# Patient Record
Sex: FEMALE | Race: WHITE | Employment: FULL TIME | URBAN - METROPOLITAN AREA
[De-identification: names, ages, dates, MRNs, and addresses within clinical notes are randomized per-mention and may not be internally consistent; named-entity substitution may affect disease eponyms.]

---

## 2020-08-10 ENCOUNTER — OFFICE VISIT (OUTPATIENT)
Dept: OBGYN CLINIC | Age: 30
End: 2020-08-10
Payer: COMMERCIAL

## 2020-08-10 ENCOUNTER — HOSPITAL ENCOUNTER (OUTPATIENT)
Dept: LAB | Age: 30
Discharge: HOME | End: 2020-08-10

## 2020-08-10 VITALS
WEIGHT: 174 LBS | DIASTOLIC BLOOD PRESSURE: 93 MMHG | SYSTOLIC BLOOD PRESSURE: 140 MMHG | BODY MASS INDEX: 27.97 KG/M2 | HEIGHT: 66 IN

## 2020-08-10 DIAGNOSIS — N92.6 IRREGULAR MENSES: ICD-10-CM

## 2020-08-10 DIAGNOSIS — Z01.419 ROUTINE GYNECOLOGICAL EXAMINATION: Primary | ICD-10-CM

## 2020-08-10 LAB
ALBUMIN SERPL-MCNC: 3.8 G/DL (ref 3.5–5)
ALBUMIN/GLOB SERPL: 1 {RATIO} (ref 1.1–2.2)
ALP SERPL-CCNC: 70 U/L (ref 45–117)
ALT SERPL-CCNC: 18 U/L (ref 12–78)
ANION GAP SERPL CALC-SCNC: 8 MMOL/L (ref 5–15)
AST SERPL-CCNC: 10 U/L (ref 15–37)
BILIRUB SERPL-MCNC: 0.2 MG/DL (ref 0.2–1)
BUN SERPL-MCNC: 10 MG/DL (ref 6–20)
BUN/CREAT SERPL: 13 (ref 12–20)
CALCIUM SERPL-MCNC: 9.4 MG/DL (ref 8.5–10.1)
CHLORIDE SERPL-SCNC: 106 MMOL/L (ref 97–108)
CO2 SERPL-SCNC: 26 MMOL/L (ref 21–32)
CREAT SERPL-MCNC: 0.78 MG/DL (ref 0.55–1.02)
GLOBULIN SER CALC-MCNC: 3.9 G/DL (ref 2–4)
GLUCOSE SERPL-MCNC: 82 MG/DL (ref 65–100)
POTASSIUM SERPL-SCNC: 4.5 MMOL/L (ref 3.5–5.1)
PROT SERPL-MCNC: 7.7 G/DL (ref 6.4–8.2)
SODIUM SERPL-SCNC: 140 MMOL/L (ref 136–145)
T4 SERPL-MCNC: 14.8 UG/DL (ref 4.8–13.9)
TSH SERPL DL<=0.05 MIU/L-ACNC: 1.61 UIU/ML (ref 0.36–3.74)

## 2020-08-10 PROCEDURE — 99385 PREV VISIT NEW AGE 18-39: CPT | Performed by: OBSTETRICS & GYNECOLOGY

## 2020-08-10 RX ORDER — NYSTATIN AND TRIAMCINOLONE ACETONIDE 100000; 1 [USP'U]/G; MG/G
OINTMENT TOPICAL 2 TIMES DAILY
Qty: 1 TUBE | Refills: 3 | Status: SHIPPED | OUTPATIENT
Start: 2020-08-10 | End: 2020-08-17

## 2020-08-10 RX ORDER — FLUCONAZOLE 150 MG/1
150 TABLET ORAL DAILY
Qty: 1 TAB | Refills: 0 | Status: SHIPPED | OUTPATIENT
Start: 2020-08-10 | End: 2020-08-11

## 2020-08-10 RX ORDER — NORETHINDRONE ACETATE AND ETHINYL ESTRADIOL 1.5-30(21)
1 KIT ORAL DAILY
COMMUNITY

## 2020-08-10 RX ORDER — DROSPIRENONE AND ETHINYL ESTRADIOL 0.03MG-3MG
1 KIT ORAL DAILY
Qty: 3 PACKAGE | Refills: 4 | Status: SHIPPED | OUTPATIENT
Start: 2020-08-10 | End: 2021-09-03

## 2020-08-10 NOTE — PROGRESS NOTES
Annual exam ages 21-44    Azucena is a No obstetric history on file. ,  31 y.o. female   Patient's last menstrual period was 07/23/2020 (exact date). She presents for her annual checkup. She is having significant irregular menses with occasional menorrhagia. She also c/o decreased libido. Menstrual status:    Her periods are irregular and occasionally heavy. Contraception:    The current method of family planning is OCP. Sexual history:    She  reports being sexually active and has had partner(s) who are Male. She reports using the following methods of birth control/protection: Pill and Condom. Medical conditions:    Since her last annual GYN exam about one year ago, she has not the following changes in her health history: none. Surgical history confirmed with patient. has a past surgical history that includes hx wisdom teeth extraction. Pap and Mammogram History:    Her most recent Pap smear was normal, obtained 1 year(s) ago per pt. The patient has never had a mammogram.    Breast Cancer History/Substance Abuse: negative    No past medical history on file. Past Surgical History:   Procedure Laterality Date    HX WISDOM TEETH EXTRACTION         Current Outpatient Medications   Medication Sig Dispense Refill    norethindrone-ethinyl estradiol-iron (Junel FE 1.5/30, 28,) 1.5 mg-30 mcg (21)/75 mg (7) tab Take 1 Tab by mouth daily. Allergies: Patient has no known allergies. Tobacco History:  reports that she has never smoked. She has never used smokeless tobacco.  Alcohol Abuse:  reports previous alcohol use. Drug Abuse:  reports no history of drug use.     Family Medical/Cancer History:   Family History   Problem Relation Age of Onset    Diabetes Maternal Grandfather         Review of Systems - History obtained from the patient  Constitutional: negative for weight loss, fever, night sweats  HEENT: negative for hearing loss, earache, congestion, snoring, sorethroat  CV: negative for chest pain, palpitations, edema  Resp: negative for cough, shortness of breath, wheezing  GI: negative for change in bowel habits, abdominal pain, black or bloody stools  : negative for frequency, dysuria, hematuria, vaginal discharge  MSK: negative for back pain, joint pain, muscle pain  Breast: negative for breast lumps, nipple discharge, galactorrhea  Skin :negative for itching, rash, hives  Neuro: negative for dizziness, headache, confusion, weakness  Psych: negative for anxiety, depression, change in mood  Heme/lymph: negative for bleeding, bruising, pallor    Physical Exam    Visit Vitals  BP (!) 140/93   Ht 5' 6\" (1.676 m)   Wt 174 lb (78.9 kg)   LMP 07/23/2020 (Exact Date)   BMI 28.08 kg/m²       Constitutional  · Appearance: well-nourished, well developed, alert, in no acute distress    HENT  · Head and Face: appears normal    Neck  · Inspection/Palpation: normal appearance, no masses or tenderness  · Lymph Nodes: no lymphadenopathy present  · Thyroid: gland size normal, nontender, no nodules or masses present on palpation    Chest  · Respiratory Effort: breathing unlabored    Breasts  · Inspection of Breasts: breasts symmetrical, no skin changes, no discharge present, nipple appearance normal, no skin retraction present  · Palpation of Breasts and Axillae: no masses present on palpation, no breast tenderness  · Axillary Lymph Nodes: no lymphadenopathy present    Gastrointestinal  · Abdominal Examination: abdomen non-tender to palpation, normal bowel sounds, no masses present  · Liver and spleen: no hepatomegaly present, spleen not palpable  · Hernias: no hernias identified    Genitourinary  · External Genitalia: normal appearance for age, no discharge present, no tenderness present, no inflammatory lesions present, no masses present, no atrophy present  · Vagina: normal vaginal vault without central or paravaginal defects, no discharge present, no inflammatory lesions present, no masses present  · Bladder: non-tender to palpation  · Urethra: appears normal  · Cervix: normal   · Uterus: normal size, shape and consistency  · Adnexa: no adnexal tenderness present, no adnexal masses present  · Perineum: perineum within normal limits, no evidence of trauma, no rashes or skin lesions present  · Anus: anus within normal limits, no hemorrhoids present  · Inguinal Lymph Nodes: no lymphadenopathy present    Skin  · General Inspection: no rash, no lesions identified    Neurologic/Psychiatric  · Mental Status:  · Orientation: grossly oriented to person, place and time  · Mood and Affect: mood normal, affect appropriate    . Assessment:  Routine gynecologic examination  Her current medical status is satisfactory with no evidence of significant gynecologic issues. Hypoactive Desire disorder- discussed potential etiologies and solutions. aub- on current ocps- will siwtch back to ocella  Increased hot flashes/sweating- will check pcos labs and tsh.     Plan:  Counseled re: diet, exercise, healthy lifestyle  Return for yearly wellness visits  Gardisil counseling provided  Pt counseled regarding co-testing for high risk HPV with pap  Rec screening mammo at either 37 or 42

## 2020-08-10 NOTE — PATIENT INSTRUCTIONS
Pelvic Exam: Care Instructions Your Care Instructions When your doctor examines all of your pelvic organs, it's called a pelvic exam. Two good reasons to have this kind of exam are to check for sexually transmitted infections (STIs) and to get a Pap test. A Pap test is also called a Pap smear. It checks for early changes that can lead to cancer of the cervix. Sometimes a pelvic exam is part of a regular checkup. Your doctor may ask you to avoid vaginal sex, tampons, vaginal medicines, vaginal sprays or powders, and douching for 1 to 2 days before the test. 
Other times, women have this kind of exam at any time of the month. This is because they have pelvic pain, bleeding, or discharge. Or they may have another pelvic problem. Before your exam, it's important to share some information with your doctor. For example, if you are a survivor of rape or sexual abuse, you can talk about any concerns you may have. Your doctor will also want to know if you are pregnant or use birth control. And he or she will want to hear about any problems, surgeries, or procedures you have had in your pelvic area. You will also need to tell your doctor when your last period was. Follow-up care is a key part of your treatment and safety. Be sure to make and go to all appointments, and call your doctor if you are having problems. It's also a good idea to know your test results and keep a list of the medicines you take. How is a pelvic exam done? · During a pelvic exam, you will: ? Take off your clothes below the waist. You will get a paper or cloth cover to put over the lower half of your body. If this is regular checkup, you may undress completely and put on a gown. ? Lie on your back on an exam table. Your feet will be raised above you. Stirrups will support your feet. · The doctor will: ? Ask you to relax your knees. Your knees need to lean out, toward the walls. ? Check the opening of your vagina for sores or swelling. ? Gently put a tool called a speculum into your vagina. It opens the vagina a little bit. You will feel some pressure. But if you are relaxed, it will not hurt. It lets your doctor see inside the vagina. ? Use a small brush, spatula, or swab to get a sample of cells, if you are having a Pap test or culture. The doctor then removes the speculum. ? Put on gloves and put one or two fingers of one hand into your vagina. The other hand goes on your lower belly. This lets your doctor feel your pelvic organs. You will probably feel some pressure. Try to stay relaxed. ? Put one gloved finger into your rectum and one into your vagina, if needed. This can also help check your pelvic organs. This exam takes about 10 minutes. At the end, you will get a washcloth or tissue to clean your vaginal area. You can then get dressed. Why is a pelvic exam done? A pelvic exam may be done: · As part of a woman's regular physical checkup. The exam may include a Pap test. 
· To check for vaginal infection. · To check for sexually transmitted infections, such as chlamydia or herpes. · To help find the cause of abnormal uterine bleeding. · To look for problems like uterine fibroids, ovarian cysts, or uterine prolapse. · To find the cause of pelvic or belly pain. · Before inserting an intrauterine device (IUD) for birth control. · To collect evidence if you've been sexually assaulted. What are the risks of a pelvic exam? 
There is a small chance that the doctor will find something on a pelvic exam that would not have caused a problem. This is called overdiagnosis. It could lead to tests or treatment you don't need. When should you call for help? Watch closely for changes in your health, and be sure to contact your doctor if you have any problems. Where can you learn more? Go to http://mary-catrina.info/ Enter D700 in the search box to learn more about \"Pelvic Exam: Care Instructions. \" Current as of: November 8, 2019               Content Version: 12.5 © 0186-7020 [employer]. Care instructions adapted under license by [employer] (which disclaims liability or warranty for this information). If you have questions about a medical condition or this instruction, always ask your healthcare professional. [place] [address] any warranty or liability for your use of this information.

## 2020-08-11 ENCOUNTER — PATIENT MESSAGE (OUTPATIENT)
Dept: OBGYN CLINIC | Age: 30
End: 2020-08-11

## 2020-08-11 LAB — PROLACTIN SERPL-MCNC: 15.5 NG/ML

## 2020-08-11 NOTE — PROGRESS NOTES
Patient advised of MD reviewed labs and recommendations. Patient was provided with Dr Kristine Muhammad phone number to contact to set up and appointment. Patient verbalized understanding.

## 2020-08-12 LAB
T3RU NFR SERPL: 17 % (ref 24–39)
TESTOST FREE SERPL-MCNC: 2.1 PG/ML (ref 0–4.2)
TESTOST SERPL-MCNC: 29 NG/DL (ref 8–48)

## 2020-08-13 LAB
CYTOLOGIST CVX/VAG CYTO: NORMAL
CYTOLOGY CVX/VAG DOC CYTO: NORMAL
CYTOLOGY CVX/VAG DOC THIN PREP: NORMAL
DX ICD CODE: NORMAL
LABCORP, 190119: NORMAL
Lab: NORMAL
OTHER STN SPEC: NORMAL
STAT OF ADQ CVX/VAG CYTO-IMP: NORMAL

## 2020-08-14 LAB — 17OHP SERPL-MCNC: 85 NG/DL

## 2020-08-19 DIAGNOSIS — R89.9 ABNORMAL LABORATORY TEST RESULT: Primary | ICD-10-CM

## 2021-03-15 ENCOUNTER — OFFICE VISIT (OUTPATIENT)
Dept: OBGYN CLINIC | Age: 31
End: 2021-03-15
Payer: COMMERCIAL

## 2021-03-15 VITALS — BODY MASS INDEX: 29.31 KG/M2 | SYSTOLIC BLOOD PRESSURE: 156 MMHG | WEIGHT: 181.6 LBS | DIASTOLIC BLOOD PRESSURE: 95 MMHG

## 2021-03-15 DIAGNOSIS — Z20.2 STD EXPOSURE: Primary | ICD-10-CM

## 2021-03-15 PROCEDURE — 99213 OFFICE O/P EST LOW 20 MIN: CPT | Performed by: OBSTETRICS & GYNECOLOGY

## 2021-03-15 NOTE — PROGRESS NOTES
Chief Complaint   Exposure to STD      HPI  Jose Manuel Devlin is a 31 y.o. female who presents for the evaluation of possible STI. No LMP recorded. She denies  symptoms at this time. The patient  denies risk factors for sexually-transmitted infection. She has a history of having unprotected intercourse. STD exposure: denies knowledge of risky exposure, pt has new partner and wants to be cautious. Previous STD history: none    No past medical history on file. Past Surgical History:   Procedure Laterality Date    HX WISDOM TEETH EXTRACTION       Social History     Occupational History    Not on file   Tobacco Use    Smoking status: Never Smoker    Smokeless tobacco: Never Used   Substance and Sexual Activity    Alcohol use: Not Currently     Frequency: Never    Drug use: Never    Sexual activity: Yes     Partners: Male     Birth control/protection: Pill, Condom     Family History   Problem Relation Age of Onset    Diabetes Maternal Grandfather        No Known Allergies  Prior to Admission medications    Medication Sig Start Date End Date Taking? Authorizing Provider   norethindrone-ethinyl estradiol-iron (Junel FE 1.5/30, 28,) 1.5 mg-30 mcg (21)/75 mg (7) tab Take 1 Tab by mouth daily. Provider, Historical   drospirenone-ethinyl estradioL (Ocella) 3-0.03 mg tab Take 1 Tab by mouth daily.  8/10/20   Humza Michaels MD        Review of Systems: History obtained from the patient  Constitutional: negative for weight loss, fever, night sweats  Breast: negative for breast lumps, nipple discharge, galactorrhea  GI: negative for change in bowel habits, abdominal pain, black or bloody stools  : negative for frequency, dysuria, hematuria, vaginal discharge  MSK: negative for back pain, joint pain, muscle pain  Skin: negative for itching, rash, hives  Psych: negative for anxiety, depression, change in mood    Objective:  Visit Vitals  BP (!) 156/95   Wt 181 lb 9.6 oz (82.4 kg)   BMI 29.31 kg/m² PHYSICAL EXAMINATION    Constitutional  · Appearance: well-nourished, well developed, alert, in no acute distress    Skin  · General Inspection: no rash, no lesions identified    Neurologic/Psychiatric  · Mental Status:  · Orientation: grossly oriented to person, place and time  · Mood and Affect: mood normal, affect appropriate      Assesment:   Possible STD exposure- testing today    Plan:   We discussed STI issues including treatment options, the necessity of treating partners and prevention of transmission. ROV prn if symptoms persist or worsen.

## 2021-03-16 LAB
HBV SURFACE AG SER QL: <0.1 INDEX
HBV SURFACE AG SER QL: NEGATIVE
HCV AB SERPL QL IA: NONREACTIVE
HCV COMMENT,HCGAC: NORMAL
HIV 1+2 AB+HIV1 P24 AG SERPL QL IA: NONREACTIVE
HIV12 RESULT COMMENT, HHIVC: NORMAL
RPR SER QL: NONREACTIVE

## 2021-03-17 LAB
C TRACH RRNA SPEC QL NAA+PROBE: NEGATIVE
N GONORRHOEA RRNA SPEC QL NAA+PROBE: NEGATIVE
SPECIMEN SOURCE: NORMAL
T VAGINALIS RRNA VAG QL NAA+PROBE: NEGATIVE

## 2021-09-03 ENCOUNTER — TELEPHONE (OUTPATIENT)
Dept: OBGYN CLINIC | Age: 31
End: 2021-09-03

## 2021-09-03 RX ORDER — DROSPIRENONE AND ETHINYL ESTRADIOL 0.03MG-3MG
1 KIT ORAL DAILY
Qty: 1 DOSE PACK | Refills: 0 | Status: SHIPPED | OUTPATIENT
Start: 2021-09-03

## 2021-09-03 NOTE — TELEPHONE ENCOUNTER
Pt calling to request refill of     drospirenone-ethinyl estradioL (Ocella) 3-0.03 mg tab    This RN sent refill rx to pt preferred pharmacy. Pt has AE 9/17.
oral

## 2021-09-13 ENCOUNTER — OFFICE VISIT (OUTPATIENT)
Dept: OBGYN CLINIC | Age: 31
End: 2021-09-13
Payer: COMMERCIAL

## 2021-09-13 VITALS — WEIGHT: 173 LBS | DIASTOLIC BLOOD PRESSURE: 96 MMHG | BODY MASS INDEX: 27.92 KG/M2 | SYSTOLIC BLOOD PRESSURE: 129 MMHG

## 2021-09-13 DIAGNOSIS — Z11.3 SCREENING EXAMINATION FOR SEXUALLY TRANSMITTED DISEASE: ICD-10-CM

## 2021-09-13 DIAGNOSIS — Z30.430 ENCOUNTER FOR IUD INSERTION: Primary | ICD-10-CM

## 2021-09-13 DIAGNOSIS — Z01.419 ENCOUNTER FOR GYNECOLOGICAL EXAMINATION WITHOUT ABNORMAL FINDING: ICD-10-CM

## 2021-09-13 PROCEDURE — 99395 PREV VISIT EST AGE 18-39: CPT | Performed by: OBSTETRICS & GYNECOLOGY

## 2021-09-13 PROCEDURE — 58300 INSERT INTRAUTERINE DEVICE: CPT | Performed by: OBSTETRICS & GYNECOLOGY

## 2021-09-13 NOTE — PROGRESS NOTES
Annual exam ages 21-44    Azucena is a No obstetric history on file. ,  36 y.o. female   No LMP recorded. She presents for her annual checkup. She is having no significant problems. Menstrual status:    Her periods are normal in flow. She is using three to ten pads or tampons per day, usually regular with a 26-32 day interval with 3-7 day duration. She does not have dysmenorrhea. She reports no premenstrual symptoms. Contraception:    The current method of family planning is OCP. Sexual history:    She  reports being sexually active and has had partner(s) who are Male. She reports using the following methods of birth control/protection: Pill and Condom. Medical conditions:    Since her last annual GYN exam about one year ago, she has not the following changes in her health history: none. Surgical history confirmed with patient. has a past surgical history that includes hx wisdom teeth extraction. Pap and Mammogram History:    Her most recent Pap smear was normal, obtained 1 year(s) ago. The patient has never had a mammogram.    Breast Cancer History/Substance Abuse: negative    No past medical history on file. Past Surgical History:   Procedure Laterality Date    HX WISDOM TEETH EXTRACTION         Current Outpatient Medications   Medication Sig Dispense Refill    drospirenone-ethinyl estradioL (Ocella) 3-0.03 mg tab Take 1 Tablet by mouth daily. 1 Dose Pack 0    norethindrone-ethinyl estradiol-iron (Junel FE 1.5/30, 28,) 1.5 mg-30 mcg (21)/75 mg (7) tab Take 1 Tab by mouth daily. Allergies: Patient has no known allergies. Tobacco History:  reports that she has never smoked. She has never used smokeless tobacco.  Alcohol Abuse:  reports previous alcohol use. Drug Abuse:  reports no history of drug use.     Family Medical/Cancer History:   Family History   Problem Relation Age of Onset    Diabetes Maternal Grandfather         Review of Systems - History obtained from the patient  Constitutional: negative for weight loss, fever, night sweats  HEENT: negative for hearing loss, earache, congestion, snoring, sorethroat  CV: negative for chest pain, palpitations, edema  Resp: negative for cough, shortness of breath, wheezing  GI: negative for change in bowel habits, abdominal pain, black or bloody stools  : negative for frequency, dysuria, hematuria, vaginal discharge  MSK: negative for back pain, joint pain, muscle pain  Breast: negative for breast lumps, nipple discharge, galactorrhea  Skin :negative for itching, rash, hives  Neuro: negative for dizziness, headache, confusion, weakness  Psych: negative for anxiety, depression, change in mood  Heme/lymph: negative for bleeding, bruising, pallor    Physical Exam    Constitutional  · Appearance: well-nourished, well developed, alert, in no acute distress    HENT  · Head and Face: appears normal    Neck  · Inspection/Palpation: normal appearance, no masses or tenderness  · Lymph Nodes: no lymphadenopathy present  · Thyroid: gland size normal, nontender, no nodules or masses present on palpation    Chest  · Respiratory Effort: breathing unlabored    Breasts  · Inspection of Breasts: breasts symmetrical, no skin changes, no discharge present, nipple appearance normal, no skin retraction present  · Palpation of Breasts and Axillae: no masses present on palpation, no breast tenderness  · Axillary Lymph Nodes: no lymphadenopathy present    Gastrointestinal  · Abdominal Examination: abdomen non-tender to palpation, normal bowel sounds, no masses present  · Liver and spleen: no hepatomegaly present, spleen not palpable  · Hernias: no hernias identified    Genitourinary  · External Genitalia: normal appearance for age, no discharge present, no tenderness present, no inflammatory lesions present, no masses present, no atrophy present  · Vagina: normal vaginal vault without central or paravaginal defects, no discharge present, no inflammatory lesions present, no masses present  · Bladder: non-tender to palpation  · Urethra: appears normal  · Cervix: normal   · Uterus: normal size, shape and consistency  · Adnexa: no adnexal tenderness present, no adnexal masses present  · Perineum: perineum within normal limits, no evidence of trauma, no rashes or skin lesions present  · Anus: anus within normal limits, no hemorrhoids present  · Inguinal Lymph Nodes: no lymphadenopathy present    Skin  · General Inspection: no rash, no lesions identified    Neurologic/Psychiatric  · Mental Status:  · Orientation: grossly oriented to person, place and time  · Mood and Affect: mood normal, affect appropriate    . Assessment:  Routine gynecologic examination  Her current medical status is satisfactory with no evidence of significant gynecologic issues. Discussed ocps and HTN risks, desires to have IUD placed today.     Plan:  Counseled re: diet, exercise, healthy lifestyle  Return for yearly wellness visits       [facility]  OFFICE PROCEDURE PROGRESS NOTE        Chart reviewed for the following:   Obinna Felix MD, have reviewed the History, Physical and updated the Allergic reactions for Asif Owen     TIME OUT performed immediately prior to start of procedure:   Obinna Felix MD, have performed the following reviews on Parris Hernandez prior to the start of the procedure:            * Patient was identified by name and date of birth   * Agreement on procedure being performed was verified  * Risks and Benefits explained to the patient  * Procedure site verified and marked as necessary  * Patient was positioned for comfort  * Consent was signed and verified     Time: 1500      Date of procedure: 9/13/2021    Procedure performed by:  Mili Burrell MD    Provider assisted by: Ethyl Dubin    Patient assisted by: self    How tolerated by patient: tolerated the procedure well with no complications    Post Procedural Pain Scale: 4 - Hurts Little More    Comments: none      Kyleena IUD INSERTION  Indications:  Camila Snyder is a No obstetric history on file. ,  36 y.o. female Anusha Maharaj Patient's last menstrual period was 09/09/2021. Her LMP was normal in duration and amount of flow. She  presents for insertion of an IUD. The risks, benefits and alternatives of IUD insertion were discussed in detail at last visit. She also has reviewed Mirena information. She has elected to proceed with the insertion today and she states she has no further questions. A urine pregnancy test was negative No components found for: SPEP, UPEP  Procedure: The pelvic exam revealed normal external genitalia. On bimanual exam the uterus was anteverted and normal in size with no tenderness present. A speculum was inserted into the vagina and the cervix was visualized. The cervix was prepped with zephiran solution. The anterior lip of the cervix was grasped with a single toothed tenaculum. The uterus was sounded with a Saldana sound to 7 centimeters. Mina Fall IUD was then inserted without difficulty. The string was cut to 3 centimeters. She experienced a moderate  amount of cramping. Post Procedure Status:   She tolerated the procedure with moderate discomfort. The patient was observed for 10 minutes after the insertion. There were no complications. Patient was discharged in stable condition.   The patient received [id number] number [id number] [facility] supplied IUD

## 2021-09-30 ENCOUNTER — TELEPHONE (OUTPATIENT)
Dept: OBGYN CLINIC | Age: 31
End: 2021-09-30

## 2021-09-30 NOTE — TELEPHONE ENCOUNTER
This is likely related to the insertion. Please add an ultrasound to her follow-up visit and let her know. She can take ibuprofen q 6 hours for the cramping.

## 2021-09-30 NOTE — TELEPHONE ENCOUNTER
Patient was advised of MD recommendations . Patient was placed on the schedule for ultrasound at 1:30pm on 10/11/2021 and then to see MD at 2:10PM      Patient was advised of pain and bleeding precautions and verbalized understanding.

## 2021-09-30 NOTE — TELEPHONE ENCOUNTER
Call received at 520    36year old patient last seen in the office on 9/13/2021 for iud insertion    Patient calling to say that she has light spotting , ( not wearing a pad) and mild cramping on most days( mild discomfort)    Patient reports last Wednesday and last night she had sharp cramping that feels like 7 on the pain scale of 1-10    Patient is wondering if that is ok       Patient is taking ibuprofen.  Patient was provided with pain and bleeding precautions          Please advise

## 2021-10-11 ENCOUNTER — OFFICE VISIT (OUTPATIENT)
Dept: OBGYN CLINIC | Age: 31
End: 2021-10-11

## 2021-10-11 VITALS — BODY MASS INDEX: 28.99 KG/M2 | DIASTOLIC BLOOD PRESSURE: 84 MMHG | WEIGHT: 179.6 LBS | SYSTOLIC BLOOD PRESSURE: 130 MMHG

## 2021-10-11 DIAGNOSIS — Z30.431 IUD CHECK UP: ICD-10-CM

## 2021-10-11 DIAGNOSIS — R10.2 PELVIC PAIN IN FEMALE: ICD-10-CM

## 2021-10-11 DIAGNOSIS — Z72.51 UNPROTECTED SEX: Primary | ICD-10-CM

## 2021-10-11 PROCEDURE — 99213 OFFICE O/P EST LOW 20 MIN: CPT | Performed by: OBSTETRICS & GYNECOLOGY

## 2021-10-11 NOTE — PROGRESS NOTES
IUD followup note    This is a follow-up visit for Wily Bronwyn is a No obstetric history on file. ,  36 y.o. female WHITE/NON- No LMP recorded. .     She had an Mirena IUD placed six weeks ago. Since the IUD placement, the patient has not had any unusual complaints. She has had some mild non-menstrual bleeding. She describes having a none for the past 2 months amount of blood-tinged discharge which has occurred off and on since insertion of the Mirena. She had significant generalized pain. It was localized to the bilaterally, and did not radiate. The pain is no longer present  She has had no fever. Associated signs and symptoms: she denies dyspareunia, expulsion, heavy bleeding, increased pain, fever, and pelvic pain. Ultrasound was done today and revealed appropriate placement of the IUD in the endometrial cavity. No past medical history on file. Past Surgical History:   Procedure Laterality Date    HX WISDOM TEETH EXTRACTION       Social History     Occupational History    Not on file   Tobacco Use    Smoking status: Never Smoker    Smokeless tobacco: Never Used   Substance and Sexual Activity    Alcohol use: Not Currently    Drug use: Never    Sexual activity: Yes     Partners: Male     Birth control/protection: Pill, Condom     Family History   Problem Relation Age of Onset    Diabetes Maternal Grandfather        No Known Allergies  Prior to Admission medications    Medication Sig Start Date End Date Taking? Authorizing Provider   drospirenone-ethinyl estradioL (Ocella) 3-0.03 mg tab Take 1 Tablet by mouth daily. 9/3/21   Jonnathan Hunt MD   norethindrone-ethinyl estradiol-iron (Junel FE 1.5/30, 28,) 1.5 mg-30 mcg (21)/75 mg (7) tab Take 1 Tab by mouth daily.   Patient not taking: Reported on 9/13/2021    Provider, Historical        Review of Systems: History obtained from the patient  Constitutional: negative for weight loss, fever, night sweats  Breast: negative for breast lumps, nipple discharge, galactorrhea  GI: negative for change in bowel habits, abdominal pain, black or bloody stools  : negative for frequency, dysuria, hematuria, vaginal discharge  MSK: negative for back pain, joint pain, muscle pain  Skin: negative for itching, rash, hives  Psych: negative for anxiety, depression, change in mood      Objective:  Visit Vitals  /84   Wt 179 lb 9.6 oz (81.5 kg)   BMI 28.99 kg/m²       Physical Exam:   PHYSICAL EXAMINATION    Constitutional  · Appearance: well-nourished, well developed, alert, in no acute distress    Gastrointestinal  · Abdominal Examination: abdomen non-tender to palpation, normal bowel sounds, no masses present  · Liver and spleen: no hepatomegaly present, spleen not palpable  · Hernias: no hernias identified    Genitourinary  · External Genitalia: normal appearance for age, no discharge present, no tenderness present, no inflammatory lesions present, no masses present, no atrophy present  · Vagina: normal vaginal vault without central or paravaginal defects, no discharge present, no inflammatory lesions present, no masses present  · Bladder: non-tender to palpation  · Urethra: appears normal  · Cervix: normal with IUD string visible and appropriate length   · Uterus: normal size, shape and consistency  · Adnexa: no adnexal tenderness present, no adnexal masses present  · Perineum: perineum within normal limits, no evidence of trauma, no rashes or skin lesions present    Skin  · General Inspection: no rash, no lesions identified    Neurologic/Psychiatric  · Mental Status:  · Orientation: grossly oriented to person, place and time  · Mood and Affect: mood normal, affect appropriate    Assessment:  Doing well with expected mild irregular bleeding. Plan:   RTO for AE as scheduled.

## 2021-10-12 LAB
HBV SURFACE AG SER QL: <0.1 INDEX
HBV SURFACE AG SER QL: NEGATIVE
HCV AB SER IA-ACNC: 1.04 INDEX
HCV AB SERPL QL IA: REACTIVE
HIV 1+2 AB+HIV1 P24 AG SERPL QL IA: NONREACTIVE
HIV12 RESULT COMMENT, HHIVC: NORMAL
RPR SER QL: NONREACTIVE

## 2021-10-13 ENCOUNTER — TELEPHONE (OUTPATIENT)
Dept: OBGYN CLINIC | Age: 31
End: 2021-10-13

## 2021-10-13 ENCOUNTER — LAB ONLY (OUTPATIENT)
Dept: OBGYN CLINIC | Age: 31
End: 2021-10-13

## 2021-10-13 DIAGNOSIS — R89.9 ABNORMAL LABORATORY TEST RESULT: Primary | ICD-10-CM

## 2021-10-13 NOTE — TELEPHONE ENCOUNTER
Needs to have a repeat HCV test (a quantitative nucleic acid test for HCV RNA) to see HCV +, can have lab appt today or later this week, please schedule

## 2021-10-13 NOTE — TELEPHONE ENCOUNTER
This RN advised pt of MD recommendations. This RN scheduled pt for lab only visit today @1300. Pt verbalized understanding.

## 2021-10-13 NOTE — TELEPHONE ENCOUNTER
Pt calling to discuss recent lab results. This RN advised pt that MD has not reviewed labs as of yet. Pt verbalized understanding.      Please review

## 2021-10-16 LAB
HCV RNA SERPL NAA+PROBE-ACNC: NORMAL IU/ML
TEST INFORMATION: NORMAL

## 2021-10-19 NOTE — TELEPHONE ENCOUNTER
Call received at 3:54pm      36year old patient last seen in the office on 10/11/2021 for problem visit    Patient calling back about her recent lab results.         HCV RT-PCR, QUANT (NON-GRAPH): Result Notes     Carlos Sharma MD   10/16/2021  3:41 PM EDT       Results normal, reviewed            HCV RT-PCR, QUANT (NON-GRAPH): Comments to Patient     The recent blood work was normal.      patient is wondering if the first result is a false positive       Please advise  Thank you

## 2023-05-17 RX ORDER — NORETHINDRONE ACETATE AND ETHINYL ESTRADIOL 1.5-30(21)
1 KIT ORAL DAILY
COMMUNITY

## 2023-05-17 RX ORDER — DROSPIRENONE AND ETHINYL ESTRADIOL 0.03MG-3MG
1 KIT ORAL DAILY
COMMUNITY
Start: 2021-09-03